# Patient Record
Sex: MALE | Race: WHITE | NOT HISPANIC OR LATINO | Employment: OTHER | URBAN - METROPOLITAN AREA
[De-identification: names, ages, dates, MRNs, and addresses within clinical notes are randomized per-mention and may not be internally consistent; named-entity substitution may affect disease eponyms.]

---

## 2020-01-15 ENCOUNTER — TELEPHONE (OUTPATIENT)
Dept: OPHTHALMOLOGY | Facility: CLINIC | Age: 60
End: 2020-01-15

## 2020-01-15 NOTE — TELEPHONE ENCOUNTER
----- Message from Princess Kebede sent at 1/15/2020  4:13 PM CST -----  Contact: [phone]  Would like to schedule appt for pt. Please call back at [phone].   Md Pallavi

## 2020-02-03 ENCOUNTER — OFFICE VISIT (OUTPATIENT)
Dept: OPHTHALMOLOGY | Facility: CLINIC | Age: 60
End: 2020-02-03
Payer: MEDICAID

## 2020-02-03 DIAGNOSIS — H25.13 NUCLEAR SCLEROSIS OF BOTH EYES: ICD-10-CM

## 2020-02-03 DIAGNOSIS — E11.9 DIABETES MELLITUS TYPE 2 WITHOUT RETINOPATHY: Primary | ICD-10-CM

## 2020-02-03 DIAGNOSIS — H01.02B SQUAMOUS BLEPHARITIS OF UPPER AND LOWER EYELIDS OF BOTH EYES: ICD-10-CM

## 2020-02-03 DIAGNOSIS — H01.02A SQUAMOUS BLEPHARITIS OF UPPER AND LOWER EYELIDS OF BOTH EYES: ICD-10-CM

## 2020-02-03 DIAGNOSIS — H52.7 REFRACTIVE ERROR: ICD-10-CM

## 2020-02-03 PROBLEM — Z72.0 TOBACCO USE: Status: ACTIVE | Noted: 2019-06-17

## 2020-02-03 PROBLEM — I10 ESSENTIAL HYPERTENSION: Status: ACTIVE | Noted: 2019-06-17

## 2020-02-03 PROBLEM — I25.10 CORONARY ARTERY CALCIFICATION SEEN ON CAT SCAN: Status: ACTIVE | Noted: 2019-06-17

## 2020-02-03 PROCEDURE — 99999 PR PBB SHADOW E&M-EST. PATIENT-LVL I: CPT | Mod: PBBFAC,,, | Performed by: OPHTHALMOLOGY

## 2020-02-03 PROCEDURE — 92004 PR EYE EXAM, NEW PATIENT,COMPREHESV: ICD-10-PCS | Mod: S$PBB,,, | Performed by: OPHTHALMOLOGY

## 2020-02-03 PROCEDURE — 92015 DETERMINE REFRACTIVE STATE: CPT | Mod: ,,, | Performed by: OPHTHALMOLOGY

## 2020-02-03 PROCEDURE — 92004 COMPRE OPH EXAM NEW PT 1/>: CPT | Mod: S$PBB,,, | Performed by: OPHTHALMOLOGY

## 2020-02-03 PROCEDURE — 99999 PR PBB SHADOW E&M-EST. PATIENT-LVL I: ICD-10-PCS | Mod: PBBFAC,,, | Performed by: OPHTHALMOLOGY

## 2020-02-03 PROCEDURE — 99211 OFF/OP EST MAY X REQ PHY/QHP: CPT | Mod: PBBFAC | Performed by: OPHTHALMOLOGY

## 2020-02-03 PROCEDURE — 92015 PR REFRACTION: ICD-10-PCS | Mod: ,,, | Performed by: OPHTHALMOLOGY

## 2020-02-03 RX ORDER — PEN NEEDLE, DIABETIC 29 G X1/2"
NEEDLE, DISPOSABLE MISCELLANEOUS
COMMUNITY

## 2020-02-03 RX ORDER — MONTELUKAST SODIUM 4 MG/1
TABLET, CHEWABLE ORAL
COMMUNITY
Start: 2019-10-29

## 2020-02-03 RX ORDER — METFORMIN HYDROCHLORIDE 1000 MG/1
TABLET ORAL
COMMUNITY

## 2020-02-03 RX ORDER — LISINOPRIL 5 MG/1
TABLET ORAL
COMMUNITY

## 2020-02-03 RX ORDER — METOPROLOL SUCCINATE 50 MG/1
TABLET, EXTENDED RELEASE ORAL
COMMUNITY
Start: 2020-01-27

## 2020-02-03 RX ORDER — INSULIN LISPRO 100 [IU]/ML
2 INJECTION, SOLUTION INTRAVENOUS; SUBCUTANEOUS DAILY PRN
COMMUNITY

## 2020-02-03 RX ORDER — INSULIN GLARGINE 100 [IU]/ML
INJECTION, SOLUTION SUBCUTANEOUS
COMMUNITY

## 2020-02-03 RX ORDER — ALBUTEROL SULFATE 90 UG/1
2 AEROSOL, METERED RESPIRATORY (INHALATION)
COMMUNITY
Start: 2015-06-21

## 2020-02-03 RX ORDER — ROSUVASTATIN CALCIUM 10 MG/1
TABLET, COATED ORAL
COMMUNITY
Start: 2020-01-20

## 2020-02-03 RX ORDER — PANTOPRAZOLE SODIUM 40 MG/1
TABLET, DELAYED RELEASE ORAL
COMMUNITY
Start: 2019-12-29

## 2020-02-03 NOTE — PROGRESS NOTES
SUBJECTIVE  John Rubio is 59 y.o. male  Uncorrected distance visual acuity was 20/40 -2 in the right eye and 20/20 in the left eye.   Chief Complaint   Patient presents with    Diabetic Eye Exam     Complete Ocular Exam          HPI     Diabetic Eye Exam      Additional comments: Complete Ocular Exam              Comments     Patient states OU is doing well, happy with OTC readers for small print.    Patient is here for complete Diabetic Exam.   FBS was 150 this morning and   last A1C was 7.9 in January of 2020.           1. NIDDM since 2009, Insulin dependent since 2015  2. Last eye exam at [facility] 2017          Last edited by Bakari Montenegro MD on 2/3/2020 10:30 AM. (History)           Assessment /Plan :  1. Diabetes mellitus type 2 without retinopathy No diabetic retinopathy at this time. Reviewed diabetic eye precautions including avoiding tobacco use,  Good glucose control, and importance of regular follow up.      2. Nuclear sclerosis of both eyes very early   Patient does reports mild visual decline from incipient cataractous changes, but not sufficient to affect activities of daily living. I recommend monitoring visual status and follow up when visual symptoms worsen.         3 Refractive error use +2.50 OTC        RTC 1 year

## 2020-02-03 NOTE — LETTER
[facility]  [address] [address]  Phone: [phone]  Fax: [phone]   February 3, 2020    Dorita Valdez MD  [address] [facility] [place] [address]    Patient: John Rubio   MR Number: [id number]   YOB: 1960   Date of Visit: 2/3/2020       Dear Dr. Valdez :    Thank you for referring John Rubio to me for evaluation. Here is my assessment and plan of care:    /Plan :  1. Diabetes mellitus type 2 without retinopathy No diabetic retinopathy at this time. Reviewed diabetic eye precautions including avoiding tobacco use,  Good glucose control, and importance of regular follow up.      2. Nuclear sclerosis of both eyes very early   Patient does reports mild visual decline from incipient cataractous changes, but not sufficient to affect activities of daily living. I recommend monitoring visual status and follow up when visual symptoms worsen.         3 Refractive error use +2.50         RTC 1 year       If you have questions, please do not hesitate to call me. I look forward to following Mr. John Rubio along with you.    Sincerely,        Bakari Montenegro MD         Dorita Valdez MD

## 2021-03-09 ENCOUNTER — OFFICE VISIT (OUTPATIENT)
Dept: OPHTHALMOLOGY | Facility: CLINIC | Age: 61
End: 2021-03-09
Payer: MEDICAID

## 2021-03-09 DIAGNOSIS — E11.9 DIABETES MELLITUS TYPE 2 WITHOUT RETINOPATHY: Primary | ICD-10-CM

## 2021-03-09 DIAGNOSIS — H25.13 NUCLEAR SCLEROSIS OF BOTH EYES: ICD-10-CM

## 2021-03-09 PROCEDURE — 99999 PR PBB SHADOW E&M-EST. PATIENT-LVL II: CPT | Mod: PBBFAC,,, | Performed by: OPHTHALMOLOGY

## 2021-03-09 PROCEDURE — 99999 PR PBB SHADOW E&M-EST. PATIENT-LVL II: ICD-10-PCS | Mod: PBBFAC,,, | Performed by: OPHTHALMOLOGY

## 2021-03-09 PROCEDURE — 99212 PR OFFICE/OUTPT VISIT, EST, LEVL II, 10-19 MIN: ICD-10-PCS | Mod: S$PBB,,, | Performed by: OPHTHALMOLOGY

## 2021-03-09 PROCEDURE — 99212 OFFICE O/P EST SF 10 MIN: CPT | Mod: S$PBB,,, | Performed by: OPHTHALMOLOGY

## 2021-03-09 PROCEDURE — 99212 OFFICE O/P EST SF 10 MIN: CPT | Mod: PBBFAC | Performed by: OPHTHALMOLOGY

## 2022-03-29 ENCOUNTER — OFFICE VISIT (OUTPATIENT)
Dept: OPHTHALMOLOGY | Facility: CLINIC | Age: 62
End: 2022-03-29
Payer: MEDICAID

## 2022-03-29 DIAGNOSIS — H25.13 NUCLEAR SCLEROSIS OF BOTH EYES: ICD-10-CM

## 2022-03-29 DIAGNOSIS — E11.9 DIABETES MELLITUS TYPE 2 WITHOUT RETINOPATHY: Primary | ICD-10-CM

## 2022-03-29 PROCEDURE — 2023F PR DILATED RETINAL EXAM W/O EVID OF RETINOPATHY: ICD-10-PCS | Mod: CPTII,,, | Performed by: OPHTHALMOLOGY

## 2022-03-29 PROCEDURE — 1159F MED LIST DOCD IN RCRD: CPT | Mod: CPTII,,, | Performed by: OPHTHALMOLOGY

## 2022-03-29 PROCEDURE — 92014 COMPRE OPH EXAM EST PT 1/>: CPT | Mod: S$PBB,,, | Performed by: OPHTHALMOLOGY

## 2022-03-29 PROCEDURE — 1159F PR MEDICATION LIST DOCUMENTED IN MEDICAL RECORD: ICD-10-PCS | Mod: CPTII,,, | Performed by: OPHTHALMOLOGY

## 2022-03-29 PROCEDURE — 1160F RVW MEDS BY RX/DR IN RCRD: CPT | Mod: CPTII,,, | Performed by: OPHTHALMOLOGY

## 2022-03-29 PROCEDURE — 99213 OFFICE O/P EST LOW 20 MIN: CPT | Mod: PBBFAC | Performed by: OPHTHALMOLOGY

## 2022-03-29 PROCEDURE — 92014 PR EYE EXAM, EST PATIENT,COMPREHESV: ICD-10-PCS | Mod: S$PBB,,, | Performed by: OPHTHALMOLOGY

## 2022-03-29 PROCEDURE — 1160F PR REVIEW ALL MEDS BY PRESCRIBER/CLIN PHARMACIST DOCUMENTED: ICD-10-PCS | Mod: CPTII,,, | Performed by: OPHTHALMOLOGY

## 2022-03-29 PROCEDURE — 4010F ACE/ARB THERAPY RXD/TAKEN: CPT | Mod: CPTII,,, | Performed by: OPHTHALMOLOGY

## 2022-03-29 PROCEDURE — 99999 PR PBB SHADOW E&M-EST. PATIENT-LVL III: ICD-10-PCS | Mod: PBBFAC,,, | Performed by: OPHTHALMOLOGY

## 2022-03-29 PROCEDURE — 4010F PR ACE/ARB THEARPY RXD/TAKEN: ICD-10-PCS | Mod: CPTII,,, | Performed by: OPHTHALMOLOGY

## 2022-03-29 PROCEDURE — 99999 PR PBB SHADOW E&M-EST. PATIENT-LVL III: CPT | Mod: PBBFAC,,, | Performed by: OPHTHALMOLOGY

## 2022-03-29 PROCEDURE — 2023F DILAT RTA XM W/O RTNOPTHY: CPT | Mod: CPTII,,, | Performed by: OPHTHALMOLOGY

## 2022-03-29 RX ORDER — GLYBURIDE 5 MG/1
5 TABLET ORAL EVERY MORNING
COMMUNITY
Start: 2022-01-10

## 2022-03-29 RX ORDER — OXCARBAZEPINE 300 MG/1
TABLET, FILM COATED ORAL
COMMUNITY
Start: 2022-03-17

## 2022-03-29 NOTE — LETTER
[facility]  [address] [place] [address]  Phone: [phone]  Fax: [phone]   March 29, 2022    Rhys Cabrera MD  [address] [place] [address]    Patient: John Rubio   MR Number: [id number]   YOB: 1960   Date of Visit: 3/29/2022       Dear Dr. Cabrera :    Thank you for referring John Rubio to me for evaluation. Here is my assessment and plan of care:    Assessment/Plan :  1. Diabetes mellitus type 2 without retinopathy No diabetic retinopathy at this time. Reviewed diabetic eye precautions including avoiding tobacco use,  Good glucose control, and importance of regular follow up.    2. Nuclear sclerosis of both eyes  -- Condition stable, no therapeutic change required. Monitoring routinely.           If you have questions, please do not hesitate to call me. I look forward to following Mr. John Rubio along with you.    Sincerely,        Bakari Montenegro MD       CC  No Recipients

## 2022-03-29 NOTE — PROGRESS NOTES
SUBJECTIVE  Johndora Rubio is 61 y.o. male  Uncorrected distance visual acuity was 20/25 +2 in the right eye and 20/20 in the left eye.   Chief Complaint   Patient presents with    Diabetic Eye Exam     Pt here for DM eye exam. No pain or discomfort. VA stable.           HPI     Diabetic Eye Exam      Additional comments: Pt here for DM eye exam. No pain or discomfort. VA   stable.               Comments     PCP- Dr. Rhys Cabrera M.D. (needs letters)  [address] [address]   Phone number [phone]   Fax number [phone]      Oralia Mendoza (patients mothers) [facility] patient   Gissell Bailey (pt's wife)       1. NIDDM since 2009, Insulin dependent since 2015   2. Last eye exam at [facility] 2017            Last edited by Kt Hannah MA on 3/29/2022  7:59 AM. (History)         Assessment /Plan :  1. Diabetes mellitus type 2 without retinopathy No diabetic retinopathy at this time. Reviewed diabetic eye precautions including avoiding tobacco use,  Good glucose control, and importance of regular follow up.    2. Nuclear sclerosis of both eyes  -- Condition stable, no therapeutic change required. Monitoring routinely.

## 2022-03-29 NOTE — LETTER
[facility]  [address] [place] [address] [place] [address]  Phone: [phone]  Fax: [phone]   March 29, 2022    Dorita Valdez MD  [address] [facility] [place] [address]    Patient: John Rubio   MR Number: [id number]   YOB: 1960   Date of Visit: 3/29/2022       Dear Dr. Valdez :    Thank you for referring John Rubio to me for evaluation. Here is my assessment and plan of care:    Assessment/Plan :  1. Diabetes mellitus type 2 without retinopathy No diabetic retinopathy at this time. Reviewed diabetic eye precautions including avoiding tobacco use,  Good glucose control, and importance of regular follow up.    2. Nuclear sclerosis of both eyes  -- Condition stable, no therapeutic change required. Monitoring routinely.           If you have questions, please do not hesitate to call me. I look forward to following Mr. John Rubio along with you.    Sincerely,        Bakari Montenegro MD       CC  No Recipients

## 2022-06-14 ENCOUNTER — TELEPHONE (OUTPATIENT)
Dept: GASTROENTEROLOGY | Facility: CLINIC | Age: 62
End: 2022-06-14
Payer: MEDICAID

## 2022-06-14 NOTE — TELEPHONE ENCOUNTER
I called the pt. wife back to inform her that we don't still have any referral right now on the pt, and the new appt. slot we have right now for new medicaid pt. is not until toward the end of the year, she verbalized understanding, stated she will check on the referral again and get back to us.

## 2022-06-14 NOTE — TELEPHONE ENCOUNTER
----- Message from Marito Fletcher sent at 6/14/2022  8:47 AM CDT -----  Contact: Dea/Wife  Patients wife is calling to speak with the nurse regarding referral that was faxed over last week. Patient is requesting a call back at [phone] to be notified and schedule if possible.   Thanks,  RP

## 2023-03-28 ENCOUNTER — OFFICE VISIT (OUTPATIENT)
Dept: OPHTHALMOLOGY | Facility: CLINIC | Age: 63
End: 2023-03-28
Payer: MEDICAID

## 2023-03-28 DIAGNOSIS — H25.13 NUCLEAR SCLEROSIS OF BOTH EYES: ICD-10-CM

## 2023-03-28 DIAGNOSIS — E11.9 DIABETES MELLITUS TYPE 2 WITHOUT RETINOPATHY: Primary | ICD-10-CM

## 2023-03-28 PROCEDURE — 2023F PR DILATED RETINAL EXAM W/O EVID OF RETINOPATHY: ICD-10-PCS | Mod: CPTII,,, | Performed by: OPHTHALMOLOGY

## 2023-03-28 PROCEDURE — 99213 OFFICE O/P EST LOW 20 MIN: CPT | Mod: PBBFAC | Performed by: OPHTHALMOLOGY

## 2023-03-28 PROCEDURE — 99999 PR PBB SHADOW E&M-EST. PATIENT-LVL III: CPT | Mod: PBBFAC,,, | Performed by: OPHTHALMOLOGY

## 2023-03-28 PROCEDURE — 92014 PR EYE EXAM, EST PATIENT,COMPREHESV: ICD-10-PCS | Mod: S$PBB,,, | Performed by: OPHTHALMOLOGY

## 2023-03-28 PROCEDURE — 99999 PR PBB SHADOW E&M-EST. PATIENT-LVL III: ICD-10-PCS | Mod: PBBFAC,,, | Performed by: OPHTHALMOLOGY

## 2023-03-28 PROCEDURE — 1160F RVW MEDS BY RX/DR IN RCRD: CPT | Mod: CPTII,,, | Performed by: OPHTHALMOLOGY

## 2023-03-28 PROCEDURE — 1160F PR REVIEW ALL MEDS BY PRESCRIBER/CLIN PHARMACIST DOCUMENTED: ICD-10-PCS | Mod: CPTII,,, | Performed by: OPHTHALMOLOGY

## 2023-03-28 PROCEDURE — 2023F DILAT RTA XM W/O RTNOPTHY: CPT | Mod: CPTII,,, | Performed by: OPHTHALMOLOGY

## 2023-03-28 PROCEDURE — 1159F MED LIST DOCD IN RCRD: CPT | Mod: CPTII,,, | Performed by: OPHTHALMOLOGY

## 2023-03-28 PROCEDURE — 92014 COMPRE OPH EXAM EST PT 1/>: CPT | Mod: S$PBB,,, | Performed by: OPHTHALMOLOGY

## 2023-03-28 PROCEDURE — 1159F PR MEDICATION LIST DOCUMENTED IN MEDICAL RECORD: ICD-10-PCS | Mod: CPTII,,, | Performed by: OPHTHALMOLOGY

## 2023-03-28 NOTE — LETTER
[facility]  [address] [place] [address]  Phone: [phone]  Fax: [phone]   March 28, 2023    Dr. Rhys Cabrera  [address]  [place] [address]    Patient: John Rubio   MR Number: [id number]   YOB: 1960   Date of Visit: 3/28/2023       Dear Dr. Cabrera :    Thank you for referring John Rubio to me for evaluation. Here is my assessment and plan of care:    Assessment/Plan :  1. Diabetes mellitus type 2 without retinopathy No diabetic retinopathy at this time. Reviewed diabetic eye precautions including avoiding tobacco use,  Good glucose control, and importance of regular follow up.      2. Nuclear sclerosis of both eyes - monitor for now     RTC in 1 year or prn any changes        If you have questions, please do not hesitate to call me. I look forward to following Mr. John Rubio along with you.    Sincerely,        Bakari Montenegro MD       CC  No Recipients

## 2023-03-28 NOTE — PROGRESS NOTES
SUBJECTIVE  John Rubio is 62 y.o. male  Uncorrected distance visual acuity was 20/25 -2 in the right eye and 20/20 -2 in the left eye.   Chief Complaint   Patient presents with    Diabetic Eye Exam     Pt reports for yearly dm exam. Denies any pain or irritation. Va stable. No drops.           HPI     Diabetic Eye Exam     Additional comments: Pt reports for yearly dm exam. Denies any pain or   irritation. Va stable. No drops.            Comments      1. DM x 2009  2. NS OU            Last edited by Mane Sandoval on 3/28/2023  7:54 AM.         Assessment /Plan :  1. Diabetes mellitus type 2 without retinopathy No diabetic retinopathy at this time. Reviewed diabetic eye precautions including avoiding tobacco use,  Good glucose control, and importance of regular follow up.      2. Nuclear sclerosis of both eyes - monitor for now     RTC in 1 year or prn any changes

## 2024-04-09 ENCOUNTER — OFFICE VISIT (OUTPATIENT)
Dept: OPHTHALMOLOGY | Facility: CLINIC | Age: 64
End: 2024-04-09
Payer: MEDICAID

## 2024-04-09 DIAGNOSIS — E11.3292 TYPE 2 DIABETES MELLITUS WITH LEFT EYE AFFECTED BY MILD NONPROLIFERATIVE RETINOPATHY WITHOUT MACULAR EDEMA, WITH LONG-TERM CURRENT USE OF INSULIN: Primary | ICD-10-CM

## 2024-04-09 DIAGNOSIS — Z79.4 TYPE 2 DIABETES MELLITUS WITH LEFT EYE AFFECTED BY MILD NONPROLIFERATIVE RETINOPATHY WITHOUT MACULAR EDEMA, WITH LONG-TERM CURRENT USE OF INSULIN: Primary | ICD-10-CM

## 2024-04-09 DIAGNOSIS — E11.9 DIABETES MELLITUS TYPE 2 WITHOUT RETINOPATHY: ICD-10-CM

## 2024-04-09 DIAGNOSIS — H25.13 NUCLEAR SCLEROSIS OF BOTH EYES: ICD-10-CM

## 2024-04-09 PROCEDURE — 2022F DILAT RTA XM EVC RTNOPTHY: CPT | Mod: CPTII,,, | Performed by: OPHTHALMOLOGY

## 2024-04-09 PROCEDURE — 1159F MED LIST DOCD IN RCRD: CPT | Mod: CPTII,,, | Performed by: OPHTHALMOLOGY

## 2024-04-09 PROCEDURE — 92014 COMPRE OPH EXAM EST PT 1/>: CPT | Mod: S$PBB,,, | Performed by: OPHTHALMOLOGY

## 2024-04-09 PROCEDURE — 99213 OFFICE O/P EST LOW 20 MIN: CPT | Mod: PBBFAC | Performed by: OPHTHALMOLOGY

## 2024-04-09 PROCEDURE — 1160F RVW MEDS BY RX/DR IN RCRD: CPT | Mod: CPTII,,, | Performed by: OPHTHALMOLOGY

## 2024-04-09 PROCEDURE — 99999 PR PBB SHADOW E&M-EST. PATIENT-LVL III: CPT | Mod: PBBFAC,,, | Performed by: OPHTHALMOLOGY

## 2024-04-09 PROCEDURE — 4010F ACE/ARB THERAPY RXD/TAKEN: CPT | Mod: CPTII,,, | Performed by: OPHTHALMOLOGY

## 2024-04-09 NOTE — LETTER
[facility]  [address] [place] [address]  [place] [address]  Phone: [phone]  Fax: [phone]   April 9, 2024    Rhys Cabrera MD  [address] [facility] [address]    Patient: John Rubio   MR Number: [id number]   YOB: 1960   Date of Visit: 4/9/2024       Dear Dr. Cabrera :    Thank you for referring John Rubio to me for evaluation. Here is my assessment and plan of care:    Assessment/Plan:  1. Type 2 diabetes mellitus with left eye affected by mild nonproliferative retinopathy without macular edema, with long-term current use of insulin - New microaneurysm. Not affecting the vision. Will continue to monitor.     2. Diabetes mellitus type 2 without retinopathy of right eye - No diabetic retinopathy at this time. Reviewed diabetic eye precautions including avoiding tobacco use,  Good glucose control, and importance of regular follow up.      3. Nuclear sclerosis of both eyes  -- Condition stable, no therapeutic change required. Monitoring routinely.       RTC in 1 year or prn any changes        If you have questions, please do not hesitate to call me. I look forward to following Mr. John Rubio along with you.    Sincerely,        Bakari Montenegro MD       CC  No Recipients

## 2024-04-09 NOTE — PROGRESS NOTES
SUBJECTIVE  John Rubio is 63 y.o. male  Uncorrected distance visual acuity was 20/20 -2 in the right eye and 20/25 +1 in the left eye.   Chief Complaint   Patient presents with    Diabetic Eye Exam     Pt here for annual DM exam. No pain or discomfort. VA stable.          HPI     Diabetic Eye Exam     Additional comments: Pt here for annual DM exam. No pain or discomfort.   VA stable.           Comments    1. DM x 2009  2. NS OU             Last edited by Kt Hannah MA on 4/9/2024  7:50 AM.         Assessment /Plan :  1. Type 2 diabetes mellitus with left eye affected by mild nonproliferative retinopathy without macular edema, with long-term current use of insulin - New MA. Not affecting the vision. Will continue to monitor.     2. Diabetes mellitus type 2 without retinopathy of right eye - No diabetic retinopathy at this time. Reviewed diabetic eye precautions including avoiding tobacco use,  Good glucose control, and importance of regular follow up.      3. Nuclear sclerosis of both eyes  -- Condition stable, no therapeutic change required. Monitoring routinely.       RTC in 1 year or prn any changes

## 2025-03-31 ENCOUNTER — TELEPHONE (OUTPATIENT)
Dept: OPHTHALMOLOGY | Facility: CLINIC | Age: 65
End: 2025-03-31
Payer: MEDICAID

## 2025-03-31 NOTE — TELEPHONE ENCOUNTER
----- Message from Nayeli sent at 3/31/2025  4:06 PM CDT -----  Contact: Wife  .Type:  Needs Medical AdviceWho Called: WifeSymptoms (please be specific):  How long has patient had these symptoms:  Pharmacy name and phone #:  Would the patient rather a call back or a response via [facility]? Call [employer] Call Back Number: [phone] Information: Pt wife states they were told  is leaving and wants to know if  still has his appt and if anything will change, please call.